# Patient Record
Sex: MALE | Race: WHITE | Employment: FULL TIME | URBAN - METROPOLITAN AREA
[De-identification: names, ages, dates, MRNs, and addresses within clinical notes are randomized per-mention and may not be internally consistent; named-entity substitution may affect disease eponyms.]

---

## 2024-10-14 ENCOUNTER — APPOINTMENT (OUTPATIENT)
Dept: ORTHOPEDIC SURGERY | Facility: CLINIC | Age: 49
End: 2024-10-14
Payer: COMMERCIAL

## 2024-10-14 ENCOUNTER — OFFICE VISIT (OUTPATIENT)
Dept: ORTHOPEDIC SURGERY | Facility: CLINIC | Age: 49
End: 2024-10-14
Payer: COMMERCIAL

## 2024-10-14 VITALS — WEIGHT: 165 LBS | HEIGHT: 68 IN | BODY MASS INDEX: 25.01 KG/M2

## 2024-10-14 DIAGNOSIS — M65.322 TRIGGER FINGER, LEFT INDEX FINGER: Primary | ICD-10-CM

## 2024-10-14 PROCEDURE — 2500000004 HC RX 250 GENERAL PHARMACY W/ HCPCS (ALT 636 FOR OP/ED)

## 2024-10-14 PROCEDURE — 99202 OFFICE O/P NEW SF 15 MIN: CPT

## 2024-10-14 PROCEDURE — 3008F BODY MASS INDEX DOCD: CPT

## 2024-10-14 PROCEDURE — 1036F TOBACCO NON-USER: CPT

## 2024-10-14 PROCEDURE — 99212 OFFICE O/P EST SF 10 MIN: CPT

## 2024-10-14 RX ORDER — TRIAMCINOLONE ACETONIDE 40 MG/ML
20 INJECTION, SUSPENSION INTRA-ARTICULAR; INTRAMUSCULAR
Status: COMPLETED | OUTPATIENT
Start: 2024-10-14 | End: 2024-10-14

## 2024-10-14 RX ORDER — LIDOCAINE HYDROCHLORIDE 20 MG/ML
0.5 INJECTION, SOLUTION INFILTRATION; PERINEURAL
Status: COMPLETED | OUTPATIENT
Start: 2024-10-14 | End: 2024-10-14

## 2024-10-14 NOTE — PROGRESS NOTES
Subjective    Patient ID: Dejan Horton is a 49 y.o. male.    Chief Complaint: Pain of the Left Index Finger (Triggering left index finger for one month)     HPI  This is a pleasant 49-year-old right-hand-dominant male presenting to the office for evaluation of left index finger, which has been triggering in flexion on a daily basis multiple times a day.  Patient states that he has had trigger fingers in the past, which were injected at [facility].  Injections have worked well for previous trigger fingers in the past.  He has not noticed any increased redness warmth or swelling.  Denies any injury.  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  Denies numbness and paresthesia of the left hand.    Objective   Ortho Exam  Pleasant in no acute distress.  Exam of the left hand and wrist reveals skin envelope is intact.  There is tenderness to palpation at the left index finger A1 pulley.  There finger was triggering with flexion at the PIP joint.  There is no tenderness to CMC joint, negative Finkelstein test.  There are no other areas of point tenderness to left hand.  2+ radial pulse.  Sensation intact to light touch.    Assessment/Plan   Encounter Diagnoses:  Trigger finger, left index finger    Plan: Discussion with patient about left index trigger finger with review of conservative treatment options.  After the risks and benefits of a left index finger A1 pulley injection was discussed, patient agreed to injection and tolerated well.  Patient is aware that he can always follow-up with a hand specialist to discuss a trigger finger release in the future if cortisone is not of benefit.  He will continue to work on left index finger flexion and extension.  He will follow-up as needed.    S Inj/Asp (Left index finger A1 pulley) on 10/14/2024 2:40 PM  Indications: pain  Details: 25 G needle  Medications: 20 mg triamcinolone acetonide 40 mg/mL; 0.5 mL lidocaine 20 mg/mL (2  %)  Procedure, treatment alternatives, risks and benefits explained, specific risks discussed. Consent was given by the patient.

## 2025-01-21 ENCOUNTER — HOSPITAL ENCOUNTER (OUTPATIENT)
Dept: RADIOLOGY | Facility: CLINIC | Age: 50
Discharge: HOME | End: 2025-01-21
Payer: COMMERCIAL

## 2025-01-21 ENCOUNTER — OFFICE VISIT (OUTPATIENT)
Dept: ORTHOPEDIC SURGERY | Facility: CLINIC | Age: 50
End: 2025-01-21
Payer: COMMERCIAL

## 2025-01-21 VITALS — HEIGHT: 68 IN | BODY MASS INDEX: 25.01 KG/M2 | WEIGHT: 165 LBS

## 2025-01-21 DIAGNOSIS — M25.511 ACUTE PAIN OF RIGHT SHOULDER: ICD-10-CM

## 2025-01-21 DIAGNOSIS — M75.81 RIGHT ROTATOR CUFF TENDONITIS: Primary | ICD-10-CM

## 2025-01-21 PROCEDURE — 2500000004 HC RX 250 GENERAL PHARMACY W/ HCPCS (ALT 636 FOR OP/ED)

## 2025-01-21 PROCEDURE — 1036F TOBACCO NON-USER: CPT

## 2025-01-21 PROCEDURE — 99213 OFFICE O/P EST LOW 20 MIN: CPT

## 2025-01-21 PROCEDURE — 99213 OFFICE O/P EST LOW 20 MIN: CPT | Mod: 25

## 2025-01-21 PROCEDURE — 73030 X-RAY EXAM OF SHOULDER: CPT | Mod: RT

## 2025-01-21 PROCEDURE — 3008F BODY MASS INDEX DOCD: CPT

## 2025-01-21 PROCEDURE — 20610 DRAIN/INJ JOINT/BURSA W/O US: CPT | Mod: RT

## 2025-01-21 PROCEDURE — 73030 X-RAY EXAM OF SHOULDER: CPT | Mod: RIGHT SIDE | Performed by: RADIOLOGY

## 2025-01-21 RX ORDER — TRIAMCINOLONE ACETONIDE 40 MG/ML
40 INJECTION, SUSPENSION INTRA-ARTICULAR; INTRAMUSCULAR
Status: COMPLETED | OUTPATIENT
Start: 2025-01-21 | End: 2025-01-21

## 2025-01-21 RX ORDER — LIDOCAINE HYDROCHLORIDE 20 MG/ML
2 INJECTION, SOLUTION INFILTRATION; PERINEURAL
Status: COMPLETED | OUTPATIENT
Start: 2025-01-21 | End: 2025-01-21

## 2025-01-21 RX ADMIN — LIDOCAINE HYDROCHLORIDE 2 ML: 20 INJECTION, SOLUTION INFILTRATION; PERINEURAL at 16:17

## 2025-01-21 RX ADMIN — TRIAMCINOLONE ACETONIDE 40 MG: 400 INJECTION, SUSPENSION INTRA-ARTICULAR; INTRAMUSCULAR at 16:17

## 2025-01-21 NOTE — PROGRESS NOTES
Subjective    Patient ID: Dejan Horton is a 49 y.o. male.    Chief Complaint: Pain of the Right Shoulder (FOR 3 MONTHS/NKI)     HPI  This is a pleasant 49-year-old right-hand-dominant male presenting to the office for evaluation of right shoulder pain, which has been ongoing for over 3 months.  There is been no known injury.  Pain has worsened over the last week atraumatically.  He points to the right shoulder and deltoid when describing the pain.  He has noticed limited range of motion, weakness, and significant difficulty with any overhead reaching activities.  It has been difficult for him to sleep due to the pain.  Patient does do repetitive motions at work, he owns a mobile donut shop.  States that he has noticed increased pain when lifting heavy bottles of oil or when mixing his donut batter.  He denies numbness and paresthesia of right upper extremity.  He has been taking ibuprofen and applying topical creams with temporary relief of symptoms.  He did see a massage therapist which helped minimally.  Patient stretches and works out daily, performs yoga.    The patient's past medical, surgical, family, and social history as well as allergies and medications were reviewed and updated in the chart.    Objective   Ortho Exam  Pleasant and no acute distress. Right shoulder normal appearance, no overlying skin changes, no muscle atrophy.  Right shoulder forward flexion 160°. No effusion or instability. There is positive Neer and Lan impingement. There is subacromial crepitus and tenderness around the subacromial space. No biceps tenderness. No acromioclavicular tenderness. Rotator cuff strength is intact but there is discomfort with strength testing. Left shoulder forward flexion 180°. No effusion or instability. No impingement or crepitus or tenderness involving the subacromial space. No biceps or acromioclavicular tenderness. There is intact rotator cuff strength. There is adequate range of motion of the  cervical spine without pain. Both upper extremities are well perfused, skin is intact and muscle tone is adequate. Elbow flexion and extension and wrist flexion and extension strength are intact. Sensation intact to light touch.    Image Results:  Multiple view x-rays of the right shoulder obtained today personally reviewed, without evidence of acute fracture or dislocation.  Mild minimal joint space narrowing of the AC joint, but the glenohumeral joint space is well-maintained.    Assessment/Plan   Encounter Diagnoses:  Right rotator cuff tendonitis    Acute pain of right shoulder    Plan: Discussion with patient in regards to right rotator cuff tendinitis with review of today's x-rays.  Conservative treatment options were discussed at length.  He can continue with his home exercise program with yoga and stretching.  He can continue seeing a massage therapist as well as taking ibuprofen and applying topical creams.  After the risks and benefits of a right shoulder subacromial bursa injection of Kenalog/lidocaine was discussed, patient agreed to injection and tolerated well.  He can receive this injection every 3 months or when pain returns.  He should avoid aggravating activities and can follow-up as needed.    L Inj/Asp: R subacromial bursa on 1/21/2025 4:17 PM  Indications: pain  Details: 22 G needle, posterior approach  Medications: 40 mg triamcinolone acetonide 40 mg/mL; 2 mL lidocaine 20 mg/mL (2 %)  Procedure, treatment alternatives, risks and benefits explained, specific risks discussed. Consent was given by the patient.            Orders Placed This Encounter    XR shoulder right 2+ views     No follow-ups on file.

## 2025-06-02 DIAGNOSIS — M25.511 ACUTE PAIN OF RIGHT SHOULDER: Primary | ICD-10-CM

## 2025-06-02 RX ORDER — MELOXICAM 15 MG/1
15 TABLET ORAL DAILY PRN
Qty: 30 TABLET | Refills: 1 | Status: SHIPPED | OUTPATIENT
Start: 2025-06-02 | End: 2025-08-01

## 2025-06-09 ENCOUNTER — APPOINTMENT (OUTPATIENT)
Dept: ORTHOPEDIC SURGERY | Facility: CLINIC | Age: 50
End: 2025-06-09
Payer: COMMERCIAL

## 2025-06-09 VITALS — WEIGHT: 165 LBS | BODY MASS INDEX: 25.01 KG/M2 | HEIGHT: 68 IN

## 2025-06-09 DIAGNOSIS — M25.511 ACUTE PAIN OF RIGHT SHOULDER: Primary | ICD-10-CM

## 2025-06-09 PROCEDURE — 99213 OFFICE O/P EST LOW 20 MIN: CPT

## 2025-06-09 PROCEDURE — 3008F BODY MASS INDEX DOCD: CPT

## 2025-06-09 PROCEDURE — 1036F TOBACCO NON-USER: CPT

## 2025-06-09 PROCEDURE — 99212 OFFICE O/P EST SF 10 MIN: CPT

## 2025-06-10 NOTE — PROGRESS NOTES
Subjective    Patient ID: Dejna Horton is a 49 y.o. male.    Chief Complaint: Follow-up (F/U RT SHOULDER/)    HPI  This is a pleasant 49-year-old right-hand-dominant male presenting to the office for follow-up in regards to right shoulder pain.  There is been no known specific injury, but patient has been struggling with right shoulder pain over the last year consistently.  He points directly to his right shoulder and deltoid when describing the discomfort.  He states that there was no known injury, but his job does require repetitive heavy lifting, mixing with his right arm, and overhead reaching.  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  2 weeks ago at a [employer] they were working out, patient experienced extreme pain, and was unable to perform his job.  He was having significant difficulty lifting the right arm, overhead reaching and mixing.  I have seen patient in the past in regards to his right shoulder and we have tried a multitude of conservative treatment options including NSAIDs, Tylenol, physical therapy, with minimal relief of symptoms.  I did recently prescribe patient meloxicam with temporary relief of symptoms.  Patient sees a chiropractor weekly for adjustments and also sees massage therapy who have performed cupping with minimal relief of symptoms.  Patient likes to remain very active performing yoga, which she has had to modify due to right shoulder pain.  He owns a mobile doughnut shop.    The patient's past medical, surgical, family, and social history as well as allergies and medications were reviewed and updated in the chart.    Objective   Ortho Exam  Pleasant and no acute distress. Right shoulder normal appearance, no overlying skin changes, no muscle atrophy.  Right shoulder forward flexion 120°. No effusion or instability. There is positive Neer and Lan impingement. There is subacromial crepitus and tenderness  around the subacromial space.  Minimal biceps tenderness. No acromioclavicular tenderness. Rotator cuff strength is significantly decreased and there is discomfort with strength testing.  Plus 4 out of 5 strength with resisted forward flexion and external rotation.  Plus 5 out of 5 strength with resisted internal rotation.  Left shoulder forward flexion 180°. No effusion or instability. No impingement or crepitus or tenderness involving the subacromial space. No biceps or acromioclavicular tenderness. There is intact rotator cuff strength. There is adequate range of motion of the cervical spine without pain. Both upper extremities are well perfused, skin is intact and muscle tone is adequate. Elbow flexion and extension and wrist flexion and extension strength are intact. Sensation intact to light touch.    Image Results:  XR shoulder right 2+ views  Narrative: Interpreted By:  Geovani Crespo,   STUDY:  XR SHOULDER RIGHT 2+ VIEWS; 1/21/2025 10:04 am      INDICATION:  Signs/Symptoms:SHOULDER PAIN.      COMPARISON:  None.      ACCESSION NUMBER(S):  [id number]      ORDERING CLINICIAN:  RUSLAN GRACE      FINDINGS:  No acute fracture or dislocation. No lytic or blastic lesions or  periosteal reaction. Degenerative changes of the right AC joint.  Ossific density adjacent to the right AC joint may be due to remote  injury. No dislocation.      Impression: No acute fracture or dislocation. Degenerative changes of the right  AC joint with an adjacent ovoid soft tissue calcification.          Signed by: Geovani Crespo 1/23/2025 7:06 PM  Dictation workstation:   [id number]    Multiple view x-rays of the right shoulder were obtained in January 2025, without evidence of acute fracture or dislocation.  Degenerative changes of the right AC joint noted, glenohumeral joint spaces well-maintained.    Assessment/Plan   Encounter Diagnoses:  Acute pain of right shoulder, high concern for right shoulder rotator cuff tear due to increased  pain weakness and limited range of motion, failed conservative treatment over the last 12 weeks including physical therapy, chiropractor treatment, massage therapy and cupping, NSAIDs including ibuprofen/meloxicam and avoiding aggravating activities    Plan: Discussion with patient in regards to right shoulder pain with review of previous right shoulder x-rays.  Patient has failed conservative treatment options including 12 weeks of physical therapy followed by home exercise program, chiropractic treatment, massage therapy and cupping, NSAIDs including ibuprofen/meloxicam, topical creams and avoiding aggravating activities.  At this time, based on patient's exam with significant pain weakness and limited range of motion, positive Neer and Lan impingement, significant weakness with resisted strength testing, there is high concern for right shoulder rotator cuff tear.  At this time, we will proceed with a right shoulder MRI to assess for the integrity of the rotator cuff.  Patient is aware that pending MRI results, he may need follow-up with an orthopedic surgeon to discuss possible surgical intervention.  MRI will be used for presurgical planning and preferably to be done at a facility with a 1.5 Therese magnet.  He will continue to avoid aggravating activities in the meantime, he can continue with meloxicam and topical creams.  He is going to continue with his home exercise program from PT and seeing his chiropractor and massage therapist.  I will follow-up with patient once MRI results are complete for further treatment options.    Orders Placed This Encounter    MR shoulder right wo IV contrast     No follow-ups on file.

## 2025-07-16 ENCOUNTER — APPOINTMENT (OUTPATIENT)
Dept: RADIOLOGY | Facility: CLINIC | Age: 50
End: 2025-07-16
Payer: COMMERCIAL

## 2025-07-16 ENCOUNTER — HOSPITAL ENCOUNTER (OUTPATIENT)
Dept: RADIOLOGY | Facility: CLINIC | Age: 50
Discharge: HOME | End: 2025-07-16
Payer: COMMERCIAL

## 2025-07-16 DIAGNOSIS — M25.511 ACUTE PAIN OF RIGHT SHOULDER: ICD-10-CM

## 2025-07-16 PROCEDURE — 73221 MRI JOINT UPR EXTREM W/O DYE: CPT | Mod: RT

## 2025-07-16 PROCEDURE — 73221 MRI JOINT UPR EXTREM W/O DYE: CPT | Mod: RIGHT SIDE | Performed by: RADIOLOGY
